# Patient Record
Sex: FEMALE | Race: AMERICAN INDIAN OR ALASKA NATIVE
[De-identification: names, ages, dates, MRNs, and addresses within clinical notes are randomized per-mention and may not be internally consistent; named-entity substitution may affect disease eponyms.]

---

## 2017-11-22 ENCOUNTER — HOSPITAL ENCOUNTER (OUTPATIENT)
Dept: HOSPITAL 5 - SPVWC | Age: 47
Discharge: HOME | End: 2017-11-22
Attending: FAMILY MEDICINE
Payer: COMMERCIAL

## 2017-11-22 DIAGNOSIS — Z12.31: Primary | ICD-10-CM

## 2017-11-22 PROCEDURE — G0202 SCR MAMMO BI INCL CAD: HCPCS

## 2017-11-22 PROCEDURE — 77067 SCR MAMMO BI INCL CAD: CPT

## 2018-01-03 ENCOUNTER — HOSPITAL ENCOUNTER (OUTPATIENT)
Dept: HOSPITAL 5 - SPVWC | Age: 48
Discharge: HOME | End: 2018-01-03
Attending: FAMILY MEDICINE
Payer: COMMERCIAL

## 2018-01-03 DIAGNOSIS — N63.10: Primary | ICD-10-CM

## 2018-01-03 DIAGNOSIS — R92.8: ICD-10-CM

## 2018-01-03 NOTE — ULTRASOUND REPORT
Right breast ultrasound:



Imaging of the right breast is performed based on a circumscribed 

nodule in the superior lateral right breast seen on recent screening 

exam of November 22. In the 9:00 location 4 cm from the nipple there is 

a well-circumscribed anechoic mass measuring 8.8 mm consistent in size 

and location with the mammographic nodule. Minimal distal enhancement. 

6 cm from nipple there are 2 adjacent similar nodules each measuring 5 

mm or less. In the 11:00 location 3 cm from the nipple and much smaller 

anechoic nodule is present. No solid nodules identified. No other 

significant findings.



Impression:



The mammographic nodule corresponds to a simple cyst by ultrasound.



Recommendation:



Annual mammogram followup.



BI-RADS CATEGORY:  2 = Benign



ACR BI-RADS MAMMOGRAPHIC CODES:

0 = Needs additional imaging evaluation; 1 = Negative; 2 = Benign; 3 = 

Probably benign; 4 = Suspicious; 5 = Malignant; 6 = Known biopsy-proven 

malignancy



COMMENT:

      1.   Dense breast tissue, i.e., adenosis, fibrocystic 

            changes, etc., may obscure an underlying neoplasm.

      2.   Approximately 10% of cancers are not detected with

            mammography.

      3.   A negative mammography report should not delay biopsy 

            if a clinically suspicious mass is present.